# Patient Record
Sex: FEMALE | Race: BLACK OR AFRICAN AMERICAN | NOT HISPANIC OR LATINO | Employment: PART TIME | URBAN - METROPOLITAN AREA
[De-identification: names, ages, dates, MRNs, and addresses within clinical notes are randomized per-mention and may not be internally consistent; named-entity substitution may affect disease eponyms.]

---

## 2017-08-18 ENCOUNTER — HOSPITAL ENCOUNTER (EMERGENCY)
Facility: HOSPITAL | Age: 22
Discharge: HOME | End: 2017-08-18
Attending: EMERGENCY MEDICINE
Payer: COMMERCIAL

## 2017-08-18 VITALS
SYSTOLIC BLOOD PRESSURE: 152 MMHG | TEMPERATURE: 98 F | DIASTOLIC BLOOD PRESSURE: 66 MMHG | WEIGHT: 202 LBS | HEART RATE: 85 BPM | HEIGHT: 65 IN | BODY MASS INDEX: 33.66 KG/M2 | OXYGEN SATURATION: 100 % | RESPIRATION RATE: 20 BRPM

## 2017-08-18 DIAGNOSIS — G44.209 TENSION HEADACHE: Primary | ICD-10-CM

## 2017-08-18 PROCEDURE — 99283 EMERGENCY DEPT VISIT LOW MDM: CPT

## 2017-08-18 RX ORDER — BUTALBITAL, ACETAMINOPHEN AND CAFFEINE 50; 325; 40 MG/1; MG/1; MG/1
1 TABLET ORAL EVERY 4 HOURS PRN
Qty: 15 TABLET | Refills: 0 | Status: SHIPPED | OUTPATIENT
Start: 2017-08-18

## 2017-08-18 NOTE — ED PROVIDER NOTES
SCRIBE #1 NOTE: I, Lynnette Garvin, am scribing for, and in the presence of, Letty Murillo DO. I have scribed the entire note.      History      Chief Complaint   Patient presents with    Headache     occipital headache since Wednesday       Review of patient's allergies indicates:  No Known Allergies     HPI   HPI    8/18/2017, 1:49 PM   History obtained from the patient      History of Present Illness: Mateo Stone is a 22 y.o. female patient who presents to the Emergency Department for a HA which onset gradually 2 days ago. Pain is located to the occipital region and radiates around to the front. Pain is described as a tension headache. Sx have been intermittent and moderate in severity. No modifying factors noted. No associated sx included. Pt denies any fever, chills, n/v, dizziness, neck pain/stiffness, visual disturbance, or speech difficulty. Pt notes that she has been stressed and has not been sleeping well. No further complaints at this time.       Arrival mode: Personal vehicle      PCP: Primary Doctor No       Past Medical History:  History reviewed. No pertinent past medical history.    Past Surgical History:  Past Surgical History:   Procedure Laterality Date    TONSILLECTOMY           Family History:  History reviewed. No pertinent family history.    Social History:  Social History     Social History Main Topics    Smoking status: Never Smoker    Smokeless tobacco: Unknown     Alcohol use No    Drug use: Unknown    Sexual activity: Unknown         ROS   Review of Systems   Constitutional: Negative for chills, diaphoresis and fever.   HENT: Negative for sore throat.    Eyes: Negative for visual disturbance.   Respiratory: Negative for shortness of breath.    Cardiovascular: Negative for chest pain.   Gastrointestinal: Negative for diarrhea, nausea and vomiting.   Genitourinary: Negative for dysuria.   Musculoskeletal: Negative for back pain, neck pain and neck stiffness.   Skin:  "Negative for rash.   Neurological: Positive for headaches. Negative for dizziness, speech difficulty, weakness, light-headedness and numbness.   Hematological: Does not bruise/bleed easily.     Physical Exam      Initial Vitals [08/18/17 1318]   BP Pulse Resp Temp SpO2   (!) 152/66 85 20 97.7 °F (36.5 °C) 100 %      MAP       94.67          Physical Exam  Nursing Notes and Vital Signs Reviewed.  Constitutional: Patient is in no acute distress. Well-developed and well-nourished.  Head: Atraumatic. Normocephalic.  Eyes: PERRL. EOM intact. Conjunctivae are not pale. No scleral icterus.  ENT: Mucous membranes are moist. Oropharynx is clear and symmetric.    Neck: Supple. Full ROM. No lymphadenopathy. Right sternocleidomastoid tenderness.   Cardiovascular: Regular rate. Regular rhythm. No murmurs, rubs, or gallops. Distal pulses are 2+ and symmetric.  Pulmonary/Chest: No respiratory distress. Clear to auscultation bilaterally. No wheezing, rales, or rhonchi.  Abdominal: Soft and non-distended.  There is no tenderness.  No rebound, guarding, or rigidity.   Musculoskeletal: Moves all extremities. No obvious deformities.   Skin: Warm and dry.  Neurological:  Alert, awake, and appropriate.  Normal speech.  No acute focal neurological deficits are appreciated.  Psychiatric: Normal affect. Good eye contact. Appropriate in content.    ED Course    Procedures  ED Vital Signs:  Vitals:    08/18/17 1318   BP: (!) 152/66   Pulse: 85   Resp: 20   Temp: 97.7 °F (36.5 °C)   TempSrc: Oral   SpO2: 100%   Weight: 91.6 kg (202 lb)   Height: 5' 5" (1.651 m)                The Emergency Provider reviewed the vital signs and test results, which are outlined above.    ED Discussion     1:54 PM Discharge: Initial encounter.  Pt assessed at this time.  Discussed exam results, shared treatment plan, f/u instructions, and specific conditions for return. Answered their questions at this time. Pt understands and agrees to the plan. Pt is stable for " discharge.       New Prescriptions    BUTALBITAL-ACETAMINOPHEN-CAFFEINE -40 MG (FIORICET, ESGIC) -40 MG PER TABLET    Take 1 tablet by mouth every 4 (four) hours as needed for Pain or Headaches.       Follow-up Information     Primary Doctor No. Schedule an appointment as soon as possible for a visit in 2 days.                   Medical Decision Making              Scribe Attestation:   Scribe #1: I performed the above scribed service and the documentation accurately describes the services I performed. I attest to the accuracy of the note.    Attending:   Physician Attestation Statement for Scribe #1: I, Letty Murillo DO, personally performed the services described in this documentation, as scribed by Lynnette Garvin, in my presence, and it is both accurate and complete.          Clinical Impression       ICD-10-CM ICD-9-CM   1. Tension headache G44.209 307.81       Disposition:   Disposition: Discharged  Condition: Stable         Tae Walls NP  08/18/17 9406

## 2022-04-29 ENCOUNTER — HOSPITAL ENCOUNTER (EMERGENCY)
Facility: HOSPITAL | Age: 27
Discharge: HOME | End: 2022-04-29
Attending: EMERGENCY MEDICINE

## 2022-04-29 VITALS
HEIGHT: 65 IN | SYSTOLIC BLOOD PRESSURE: 133 MMHG | OXYGEN SATURATION: 100 % | DIASTOLIC BLOOD PRESSURE: 78 MMHG | BODY MASS INDEX: 33.32 KG/M2 | RESPIRATION RATE: 18 BRPM | TEMPERATURE: 98 F | HEART RATE: 83 BPM | WEIGHT: 200 LBS

## 2022-04-29 DIAGNOSIS — R10.13 EPIGASTRIC ABDOMINAL PAIN: ICD-10-CM

## 2022-04-29 DIAGNOSIS — K59.00 CONSTIPATION, UNSPECIFIED CONSTIPATION TYPE: ICD-10-CM

## 2022-04-29 DIAGNOSIS — R10.10 UPPER ABDOMINAL PAIN: ICD-10-CM

## 2022-04-29 DIAGNOSIS — N39.0 URINARY TRACT INFECTION WITH HEMATURIA, SITE UNSPECIFIED: Primary | ICD-10-CM

## 2022-04-29 DIAGNOSIS — R31.9 URINARY TRACT INFECTION WITH HEMATURIA, SITE UNSPECIFIED: Primary | ICD-10-CM

## 2022-04-29 DIAGNOSIS — K59.00 CONSTIPATION: ICD-10-CM

## 2022-04-29 LAB
ALBUMIN SERPL BCP-MCNC: 3.8 G/DL (ref 3.5–5.2)
ALP SERPL-CCNC: 68 U/L (ref 55–135)
ALT SERPL W/O P-5'-P-CCNC: 19 U/L (ref 10–44)
ANION GAP SERPL CALC-SCNC: 13 MMOL/L (ref 8–16)
AST SERPL-CCNC: 12 U/L (ref 10–40)
B-HCG UR QL: NEGATIVE
BACTERIA #/AREA URNS HPF: ABNORMAL /HPF
BASOPHILS # BLD AUTO: 0.03 K/UL (ref 0–0.2)
BASOPHILS NFR BLD: 0.4 % (ref 0–1.9)
BILIRUB SERPL-MCNC: 0.3 MG/DL (ref 0.1–1)
BILIRUB UR QL STRIP: NEGATIVE
BUN SERPL-MCNC: 8 MG/DL (ref 6–20)
CALCIUM SERPL-MCNC: 9.4 MG/DL (ref 8.7–10.5)
CHLORIDE SERPL-SCNC: 105 MMOL/L (ref 95–110)
CLARITY UR: ABNORMAL
CO2 SERPL-SCNC: 26 MMOL/L (ref 23–29)
COLOR UR: YELLOW
CREAT SERPL-MCNC: 1 MG/DL (ref 0.5–1.4)
CTP QC/QA: YES
DIFFERENTIAL METHOD: NORMAL
EOSINOPHIL # BLD AUTO: 0 K/UL (ref 0–0.5)
EOSINOPHIL NFR BLD: 0.5 % (ref 0–8)
ERYTHROCYTE [DISTWIDTH] IN BLOOD BY AUTOMATED COUNT: 13.1 % (ref 11.5–14.5)
EST. GFR  (AFRICAN AMERICAN): >60 ML/MIN/1.73 M^2
EST. GFR  (NON AFRICAN AMERICAN): >60 ML/MIN/1.73 M^2
GLUCOSE SERPL-MCNC: 81 MG/DL (ref 70–110)
GLUCOSE UR QL STRIP: NEGATIVE
HCT VFR BLD AUTO: 43.1 % (ref 37–48.5)
HGB BLD-MCNC: 14 G/DL (ref 12–16)
HGB UR QL STRIP: ABNORMAL
IMM GRANULOCYTES # BLD AUTO: 0.02 K/UL (ref 0–0.04)
IMM GRANULOCYTES NFR BLD AUTO: 0.2 % (ref 0–0.5)
KETONES UR QL STRIP: NEGATIVE
LEUKOCYTE ESTERASE UR QL STRIP: ABNORMAL
LIPASE SERPL-CCNC: 10 U/L (ref 4–60)
LYMPHOCYTES # BLD AUTO: 2.2 K/UL (ref 1–4.8)
LYMPHOCYTES NFR BLD: 26.2 % (ref 18–48)
MCH RBC QN AUTO: 29.8 PG (ref 27–31)
MCHC RBC AUTO-ENTMCNC: 32.5 G/DL (ref 32–36)
MCV RBC AUTO: 92 FL (ref 82–98)
MICROSCOPIC COMMENT: ABNORMAL
MONOCYTES # BLD AUTO: 0.5 K/UL (ref 0.3–1)
MONOCYTES NFR BLD: 6.1 % (ref 4–15)
NEUTROPHILS # BLD AUTO: 5.6 K/UL (ref 1.8–7.7)
NEUTROPHILS NFR BLD: 66.6 % (ref 38–73)
NITRITE UR QL STRIP: POSITIVE
NRBC BLD-RTO: 0 /100 WBC
PH UR STRIP: 6 [PH] (ref 5–8)
PLATELET # BLD AUTO: 322 K/UL (ref 150–450)
PMV BLD AUTO: 9.2 FL (ref 9.2–12.9)
POTASSIUM SERPL-SCNC: 4.3 MMOL/L (ref 3.5–5.1)
PROT SERPL-MCNC: 8.5 G/DL (ref 6–8.4)
PROT UR QL STRIP: ABNORMAL
RBC # BLD AUTO: 4.7 M/UL (ref 4–5.4)
RBC #/AREA URNS HPF: 8 /HPF (ref 0–4)
SODIUM SERPL-SCNC: 144 MMOL/L (ref 136–145)
SP GR UR STRIP: 1.01 (ref 1–1.03)
SQUAMOUS #/AREA URNS HPF: 14 /HPF
URN SPEC COLLECT METH UR: ABNORMAL
UROBILINOGEN UR STRIP-ACNC: NEGATIVE EU/DL
WBC # BLD AUTO: 8.47 K/UL (ref 3.9–12.7)
WBC #/AREA URNS HPF: >100 /HPF (ref 0–5)
WBC CLUMPS URNS QL MICRO: ABNORMAL

## 2022-04-29 PROCEDURE — 87086 URINE CULTURE/COLONY COUNT: CPT | Performed by: PHYSICIAN ASSISTANT

## 2022-04-29 PROCEDURE — 99285 EMERGENCY DEPT VISIT HI MDM: CPT | Mod: 25

## 2022-04-29 PROCEDURE — 93010 ELECTROCARDIOGRAM REPORT: CPT | Mod: ,,, | Performed by: INTERNAL MEDICINE

## 2022-04-29 PROCEDURE — 81000 URINALYSIS NONAUTO W/SCOPE: CPT | Performed by: PHYSICIAN ASSISTANT

## 2022-04-29 PROCEDURE — 87088 URINE BACTERIA CULTURE: CPT | Performed by: PHYSICIAN ASSISTANT

## 2022-04-29 PROCEDURE — 81025 URINE PREGNANCY TEST: CPT | Performed by: NURSE PRACTITIONER

## 2022-04-29 PROCEDURE — 80053 COMPREHEN METABOLIC PANEL: CPT | Performed by: PHYSICIAN ASSISTANT

## 2022-04-29 PROCEDURE — 85025 COMPLETE CBC W/AUTO DIFF WBC: CPT | Performed by: PHYSICIAN ASSISTANT

## 2022-04-29 PROCEDURE — 83690 ASSAY OF LIPASE: CPT | Performed by: PHYSICIAN ASSISTANT

## 2022-04-29 PROCEDURE — 87186 SC STD MICRODIL/AGAR DIL: CPT | Mod: 59 | Performed by: PHYSICIAN ASSISTANT

## 2022-04-29 PROCEDURE — 87077 CULTURE AEROBIC IDENTIFY: CPT | Performed by: PHYSICIAN ASSISTANT

## 2022-04-29 PROCEDURE — 93010 EKG 12-LEAD: ICD-10-PCS | Mod: ,,, | Performed by: INTERNAL MEDICINE

## 2022-04-29 PROCEDURE — 93005 ELECTROCARDIOGRAM TRACING: CPT

## 2022-04-29 PROCEDURE — 25000003 PHARM REV CODE 250: Performed by: PHYSICIAN ASSISTANT

## 2022-04-29 RX ORDER — CEPHALEXIN 500 MG/1
500 CAPSULE ORAL EVERY 12 HOURS
Qty: 14 CAPSULE | Refills: 0 | Status: SHIPPED | OUTPATIENT
Start: 2022-04-29 | End: 2022-05-06

## 2022-04-29 RX ORDER — LIDOCAINE HYDROCHLORIDE 20 MG/ML
10 SOLUTION OROPHARYNGEAL ONCE
Status: COMPLETED | OUTPATIENT
Start: 2022-04-29 | End: 2022-04-29

## 2022-04-29 RX ORDER — MAG HYDROX/ALUMINUM HYD/SIMETH 200-200-20
30 SUSPENSION, ORAL (FINAL DOSE FORM) ORAL ONCE
Status: COMPLETED | OUTPATIENT
Start: 2022-04-29 | End: 2022-04-29

## 2022-04-29 RX ORDER — GLYCERIN 1 G/1
1 SUPPOSITORY RECTAL
Qty: 25 SUPPOSITORY | Refills: 0 | Status: SHIPPED | OUTPATIENT
Start: 2022-04-29

## 2022-04-29 RX ADMIN — MAGNESIUM HYDROXIDE/ALUMINUM HYDROXICE/SIMETHICONE 30 ML: 120; 1200; 1200 SUSPENSION ORAL at 04:04

## 2022-04-29 RX ADMIN — LIDOCAINE HYDROCHLORIDE 10 ML: 20 SOLUTION ORAL; TOPICAL at 04:04

## 2022-04-29 NOTE — FIRST PROVIDER EVALUATION
Emergency Department TeleTriage Encounter Note      CHIEF COMPLAINT    Chief Complaint   Patient presents with    Abdominal Pain     Pt c/c generalized abd pain of a cramping and aching 10/10 constant since Tuesday. Pt states she has been constipated and has not had a normal BM since last week. Pt states she has been able to go to the bathroom but barely anything comes out. Pt denies diarrhea and vomited but is nauseated.     Constipation       VITAL SIGNS   Initial Vitals [04/29/22 1449]   BP Pulse Resp Temp SpO2   (!) 147/101 85 20 98.4 °F (36.9 °C) 100 %      MAP       --            ALLERGIES    Review of patient's allergies indicates:  No Known Allergies    PROVIDER TRIAGE NOTE   Patient presents to the ER with severe upper abdominal pain radiating around to her back x 4 days.  She took ex lax without improvement.  Last normal BM was 1 week ago, she is passing only small amounts of stool since this morning.  She denies vomiting or fever.   Pain is intermittent and worse with eating.    Will order labs and GI cocktail pending ED provider evaluation.    Patient appears uncomfortable reporting epigastric abdominal pain, vital signs are stable.      Initial orders will be placed and care will be transferred to an alternate provider when patient is roomed for a full evaluation. Any additional orders and the final disposition will be determined by that provider.          ORDERS  Labs Reviewed - No data to display    ED Orders (720h ago, onward)    None            Virtual Visit Note: The provider triage portion of this emergency department evaluation and documentation was performed via [employer], a HIPAA-compliant telemedicine application, in concert with a tele-presenter in the room. A face to face patient evaluation with one of my colleagues will occur once the patient is placed in an emergency department room.      DISCLAIMER: This note was prepared with [employer] voice recognition transcription software.  Garbled syntax, mangled pronouns, and other bizarre constructions may be attributed to that software system.

## 2022-04-29 NOTE — ED PROVIDER NOTES
Encounter Date: 4/29/2022       History     Chief Complaint   Patient presents with    Abdominal Pain     Pt c/c generalized abd pain of a cramping and aching 10/10 constant since Tuesday. Pt states she has been constipated and has not had a normal BM since last week. Pt states she has been able to go to the bathroom but barely anything comes out. Pt denies diarrhea and vomited but is nauseated.     Constipation     27-year-old female with no pertinent past medical history presents for evaluation of upper abdominal pain, nausea, and constipation.  States that symptoms began four days ago.  Reports that she has had bowel movements but they are smaller than normal.  Reports that upper abdominal pain is worse postprandially.  Also reports dysuria and urinary frequency that began last week.  Denies diarrhea, vomiting, fevers, chest pain, shortness of breath, vaginal bleeding, vaginal discharge, or any additional symptoms.  She has taken MiraLax for her constipation and reports that she had a bowel movement afterward.        Review of patient's allergies indicates:  No Known Allergies  History reviewed. No pertinent past medical history.  Past Surgical History:   Procedure Laterality Date    TONSILLECTOMY       History reviewed. No pertinent family history.  Social History     Tobacco Use    Smoking status: Never Smoker    Smokeless tobacco: Never Used   Substance Use Topics    Alcohol use: No     Review of Systems   Constitutional: Negative for chills, fatigue and fever.   HENT: Negative for congestion, ear pain, nosebleeds, postnasal drip, rhinorrhea, sinus pressure and sore throat.    Eyes: Negative for photophobia and pain.   Respiratory: Negative for apnea, cough, choking, chest tightness, shortness of breath, wheezing and stridor.    Cardiovascular: Negative for chest pain, palpitations and leg swelling.   Gastrointestinal: Positive for abdominal pain (Upper), constipation and nausea. Negative for blood in  stool, diarrhea and vomiting.   Genitourinary: Positive for dysuria and frequency. Negative for flank pain, hematuria, pelvic pain and vaginal discharge.   Musculoskeletal: Negative for arthralgias, back pain, gait problem and neck pain.   Skin: Negative for color change, pallor, rash and wound.   Neurological: Negative for dizziness, seizures, syncope, facial asymmetry, light-headedness and headaches.   Hematological: Negative for adenopathy.   Psychiatric/Behavioral: Negative for confusion. The patient is not nervous/anxious.        Physical Exam     Initial Vitals [04/29/22 1449]   BP Pulse Resp Temp SpO2   (!) 147/101 85 20 98.4 °F (36.9 °C) 100 %      MAP       --         Physical Exam    Nursing note and vitals reviewed.  Constitutional: She appears well-developed and well-nourished. She is not diaphoretic. No distress.   HENT:   Head: Normocephalic and atraumatic.   Right Ear: External ear normal.   Left Ear: External ear normal.   Nose: Nose normal.   Eyes: Conjunctivae and EOM are normal. Right eye exhibits no discharge. Left eye exhibits no discharge.   Neck: Neck supple. No tracheal deviation present.   Normal range of motion.  Cardiovascular: Normal rate.   Pulmonary/Chest: No stridor. No respiratory distress.   Abdominal: Abdomen is soft. She exhibits no distension. There is abdominal tenderness in the right upper quadrant and epigastric area.   Epigastric and right upper quadrant abdominal tenderness to palpation.  Abdomen is soft.  No rigidity or guarding.  Remaining abdomen nontender There is no guarding.   Musculoskeletal:         General: No tenderness. Normal range of motion.      Cervical back: Normal range of motion and neck supple.     Neurological: She is alert and oriented to person, place, and time. She has normal strength. No cranial nerve deficit or sensory deficit.   Skin: Skin is warm and dry.   Psychiatric: She has a normal mood and affect. Her behavior is normal. Judgment and thought  content normal.         ED Course   Procedures  Labs Reviewed   CULTURE, URINE - Abnormal; Notable for the following components:       Result Value    Urine Culture, Routine   (*)     Value: ESCHERICHIA COLI  >100,000 cfu/ml      All other components within normal limits    Narrative:     Specimen Source->Urine   COMPREHENSIVE METABOLIC PANEL - Abnormal; Notable for the following components:    Total Protein 8.5 (*)     All other components within normal limits   URINALYSIS, REFLEX TO URINE CULTURE - Abnormal; Notable for the following components:    Appearance, UA Hazy (*)     Protein, UA Trace (*)     Occult Blood UA 1+ (*)     Nitrite, UA Positive (*)     Leukocytes, UA 3+ (*)     All other components within normal limits    Narrative:     Specimen Source->Urine   URINALYSIS MICROSCOPIC - Abnormal; Notable for the following components:    RBC, UA 8 (*)     WBC, UA >100 (*)     WBC Clumps, UA Occasional (*)     All other components within normal limits    Narrative:     Specimen Source->Urine   CBC W/ AUTO DIFFERENTIAL   LIPASE   POCT URINE PREGNANCY        ECG Results          EKG 12-lead (Final result)  Result time 05/03/22 22:03:40    Final result by Interface, Lab In [facility] (05/03/22 22:03:40)                 Narrative:    Test Reason : R10.13,    Vent. Rate : 084 BPM     Atrial Rate : 084 BPM     P-R Int : 164 ms          QRS Dur : 074 ms      QT Int : 342 ms       P-R-T Axes : 054 056 036 degrees     QTc Int : 404 ms    Normal sinus rhythm  Possible Left atrial enlargement  Borderline Abnormal ECG    Confirmed by Breanna BUITRAGO, Juan Pablo DIAZ ([id number]) on 5/3/2022 10:03:33 PM    Referred By: [email or web address]   SELF           Confirmed By:Juan Pablo Carlos MD                            Imaging Results          US Abdomen Limited (Final result)  Result time 04/29/22 18:36:50    Final result by Ramona Bird MD (04/29/22 18:36:50)                 Impression:      No significant sonographic abnormality.    Hepatic  steatosis.      Electronically signed by: Ramona Bird  Date:    04/29/2022  Time:    18:36             Narrative:    EXAMINATION:  ULTRASOUND ABDOMEN LIMITED    CLINICAL HISTORY:  epigastric and RUQ abd TTP;    TECHNIQUE:  Limited ultrasound of the right upper quadrant of the abdomen (including pancreas, liver, gallbladder, common bile duct, and spleen) was performed.    COMPARISON:  None.    FINDINGS:  Liver: Normal in size, measuring 14.6 cm. Mild increased in echotexture.  No focal hepatic lesions.  The hepatorenal index is abnormal or elevated at 1.7.    Gallbladder: No calculi.  No wall thickening, or pericholecystic fluid.  No sonographic Cedeno's sign.    Biliary system: The common duct is not dilated, measuring 2.9 mm.  No intrahepatic ductal dilatation.    Spleen: The spleen is normal in size, measuring 8 x 3.2 cm.    Miscellaneous: Pancreas is partially visualized as it is obscured by overlying bowel gas.                               X-Ray Abdomen Flat And Erect (Final result)  Result time 04/29/22 18:13:43    Final result by Ramona Bird MD (04/29/22 18:13:43)                 Impression:      Nonspecific bowel gas pattern.      Electronically signed by: Ramona Bird  Date:    04/29/2022  Time:    18:13             Narrative:    EXAMINATION:  ABDOMEN FLAT AND ERECT    CLINICAL HISTORY:  Constipation, unspecified    TECHNIQUE:  Abdomen flat and erect radiographs were submitted.    COMPARISON:  None.    FINDINGS:  Examination is limited by body habitus.  Abdomen flat and erect radiographs demonstrate a nonspecific bowel gas pattern.  There are no dilated loops of small bowel or air-fluid levels detected.  There is no free air detected under the diaphragm.  No excessive fecal material is detected.                                 Medications   aluminum-magnesium hydroxide-simethicone 200-200-20 mg/5 mL suspension 30 mL (30 mLs Oral Given 4/29/22 1613)     And   LIDOcaine HCl 2% oral solution 10  mL (10 mLs Oral Given 4/29/22 1613)     Medical Decision Making:   History:   Old Medical Records: I decided to obtain old medical records.  Clinical Tests:   Lab Tests: Ordered and Reviewed  Radiological Study: Ordered and Reviewed  ED Management:  Labs with evidence of UTI.  Otherwise unremarkable.  Urine culture in process.  Will treat with Keflex.  Imaging unremarkable.  No evidence of bowel obstruction.  Patient is tolerating p.o. without difficulty.  No evidence of emergent pathology at this time.                      Clinical Impression:   Final diagnoses:  [R10.13] Epigastric abdominal pain  [K59.00] Constipation  [N39.0, R31.9] Urinary tract infection with hematuria, site unspecified (Primary)  [R10.10] Upper abdominal pain  [K59.00] Constipation, unspecified constipation type          ED Disposition Condition    Discharge Stable        ED Prescriptions     Medication Sig Dispense Start Date End Date Auth. Provider    cephALEXin (KEFLEX) 500 MG capsule Take 1 capsule (500 mg total) by mouth every 12 (twelve) hours. for 7 days 14 capsule 4/29/2022 5/6/2022 Sidney Osborn NP    glycerin adult suppository Place 1 suppository rectally as needed for Constipation. 25 suppository 4/29/2022  Sidney Osborn NP        Follow-up Information     Follow up With Specialties Details Why Contact Info    [facility]  Schedule an appointment as soon as possible for a visit in 1 week For further evaluation [address] [facility] [address]  [phone]      [facility] Emergency Dept Emergency Medicine Go to  If symptoms worsen, As needed [address] [facility] [id number] [phone]           Sidney Osborn NP  05/04/22 2599

## 2022-04-30 NOTE — DISCHARGE INSTRUCTIONS
Take antibiotics as prescribed until they are gone.  You should not have any left over.  Take all other medications as prescribed.    Thank you for coming to our Emergency Department today. It is important to remember that some problems are difficult to diagnose and may not be found during your first visit. Be sure to follow up with your primary care doctor.  If you do not have one, you may contact the one listed on your discharge paperwork or you may also call the [facility] Appointment Desk at [phone] to schedule an appointment with one.     Return to the ER with any questions/concerns, new/concerning symptoms, worsening or failure to improve. Do not drive or make any important decisions for 24 hours if you have received any pain medications, sedatives or mood altering drugs during your ER visit.

## 2022-05-01 LAB — BACTERIA UR CULT: ABNORMAL
